# Patient Record
Sex: FEMALE | Race: WHITE | NOT HISPANIC OR LATINO
[De-identification: names, ages, dates, MRNs, and addresses within clinical notes are randomized per-mention and may not be internally consistent; named-entity substitution may affect disease eponyms.]

---

## 2018-07-12 ENCOUNTER — ASOB RESULT (OUTPATIENT)
Age: 33
End: 2018-07-12

## 2018-07-12 ENCOUNTER — APPOINTMENT (OUTPATIENT)
Dept: MATERNAL FETAL MEDICINE | Facility: CLINIC | Age: 33
End: 2018-07-12
Payer: COMMERCIAL

## 2018-07-12 ENCOUNTER — APPOINTMENT (OUTPATIENT)
Dept: ANTEPARTUM | Facility: CLINIC | Age: 33
End: 2018-07-12
Payer: COMMERCIAL

## 2018-07-12 PROCEDURE — 99241 OFFICE CONSULTATION NEW/ESTAB PATIENT 15 MIN: CPT

## 2018-07-12 PROCEDURE — 76801 OB US < 14 WKS SINGLE FETUS: CPT

## 2018-07-16 ENCOUNTER — APPOINTMENT (OUTPATIENT)
Dept: MATERNAL FETAL MEDICINE | Facility: CLINIC | Age: 33
End: 2018-07-16
Payer: COMMERCIAL

## 2018-07-16 ENCOUNTER — ASOB RESULT (OUTPATIENT)
Age: 33
End: 2018-07-16

## 2018-07-16 ENCOUNTER — APPOINTMENT (OUTPATIENT)
Dept: ANTEPARTUM | Facility: CLINIC | Age: 33
End: 2018-07-16
Payer: COMMERCIAL

## 2018-07-16 PROCEDURE — 99241 OFFICE CONSULTATION NEW/ESTAB PATIENT 15 MIN: CPT | Mod: 25

## 2018-07-16 PROCEDURE — 59015 CHORION BIOPSY: CPT

## 2018-07-16 PROCEDURE — 76945 ECHO GUIDE VILLUS SAMPLING: CPT

## 2018-07-20 ENCOUNTER — TRANSCRIPTION ENCOUNTER (OUTPATIENT)
Age: 33
End: 2018-07-20

## 2018-07-30 ENCOUNTER — APPOINTMENT (OUTPATIENT)
Dept: MATERNAL FETAL MEDICINE | Facility: CLINIC | Age: 33
End: 2018-07-30

## 2018-07-30 ENCOUNTER — ASOB RESULT (OUTPATIENT)
Age: 33
End: 2018-07-30

## 2018-07-30 ENCOUNTER — APPOINTMENT (OUTPATIENT)
Dept: ANTEPARTUM | Facility: CLINIC | Age: 33
End: 2018-07-30
Payer: COMMERCIAL

## 2018-07-30 PROCEDURE — 76813 OB US NUCHAL MEAS 1 GEST: CPT

## 2018-09-17 ENCOUNTER — APPOINTMENT (OUTPATIENT)
Dept: ANTEPARTUM | Facility: CLINIC | Age: 33
End: 2018-09-17
Payer: COMMERCIAL

## 2018-09-17 ENCOUNTER — ASOB RESULT (OUTPATIENT)
Age: 33
End: 2018-09-17

## 2018-09-17 PROCEDURE — 76811 OB US DETAILED SNGL FETUS: CPT

## 2018-11-12 ENCOUNTER — ASOB RESULT (OUTPATIENT)
Age: 33
End: 2018-11-12

## 2018-11-12 ENCOUNTER — APPOINTMENT (OUTPATIENT)
Dept: ANTEPARTUM | Facility: CLINIC | Age: 33
End: 2018-11-12
Payer: COMMERCIAL

## 2018-11-12 PROCEDURE — 76816 OB US FOLLOW-UP PER FETUS: CPT

## 2018-12-31 ENCOUNTER — ASOB RESULT (OUTPATIENT)
Age: 33
End: 2018-12-31

## 2018-12-31 ENCOUNTER — APPOINTMENT (OUTPATIENT)
Dept: ANTEPARTUM | Facility: CLINIC | Age: 33
End: 2018-12-31
Payer: COMMERCIAL

## 2018-12-31 PROCEDURE — 76816 OB US FOLLOW-UP PER FETUS: CPT

## 2019-01-27 ENCOUNTER — TRANSCRIPTION ENCOUNTER (OUTPATIENT)
Age: 34
End: 2019-01-27

## 2021-03-16 ENCOUNTER — APPOINTMENT (OUTPATIENT)
Dept: INTERNAL MEDICINE | Facility: CLINIC | Age: 36
End: 2021-03-16
Payer: COMMERCIAL

## 2021-03-16 VITALS
WEIGHT: 133 LBS | SYSTOLIC BLOOD PRESSURE: 108 MMHG | DIASTOLIC BLOOD PRESSURE: 71 MMHG | HEART RATE: 67 BPM | RESPIRATION RATE: 12 BRPM | HEIGHT: 64 IN | BODY MASS INDEX: 22.71 KG/M2 | OXYGEN SATURATION: 98 %

## 2021-03-16 DIAGNOSIS — Z83.3 FAMILY HISTORY OF DIABETES MELLITUS: ICD-10-CM

## 2021-03-16 DIAGNOSIS — Z78.9 OTHER SPECIFIED HEALTH STATUS: ICD-10-CM

## 2021-03-16 DIAGNOSIS — Z83.438 FAMILY HISTORY OF OTHER DISORDER OF LIPOPROTEIN METABOLISM AND OTHER LIPIDEMIA: ICD-10-CM

## 2021-03-16 DIAGNOSIS — Z83.79 FAMILY HISTORY OF OTHER DISEASES OF THE DIGESTIVE SYSTEM: ICD-10-CM

## 2021-03-16 DIAGNOSIS — Z82.49 FAMILY HISTORY OF ISCHEMIC HEART DISEASE AND OTHER DISEASES OF THE CIRCULATORY SYSTEM: ICD-10-CM

## 2021-03-16 DIAGNOSIS — Z83.49 FAMILY HISTORY OF OTHER ENDOCRINE, NUTRITIONAL AND METABOLIC DISEASES: ICD-10-CM

## 2021-03-16 DIAGNOSIS — Z82.61 FAMILY HISTORY OF ARTHRITIS: ICD-10-CM

## 2021-03-16 PROCEDURE — 99072 ADDL SUPL MATRL&STAF TM PHE: CPT

## 2021-03-16 PROCEDURE — 99204 OFFICE O/P NEW MOD 45 MIN: CPT | Mod: 25

## 2021-03-16 PROCEDURE — 36415 COLL VENOUS BLD VENIPUNCTURE: CPT

## 2021-03-16 NOTE — REVIEW OF SYSTEMS
[Fever] : no fever [Chills] : no chills [Fatigue] : no fatigue [Chest Pain] : no chest pain [Shortness Of Breath] : no shortness of breath [Abdominal Pain] : no abdominal pain [Nausea] : no nausea [Constipation] : no constipation [Diarrhea] : diarrhea [Vomiting] : no vomiting [Joint Pain] : no joint pain [Nail Changes] : no nail changes [Hair Changes] : no hair changes [Skin Rash] : no skin rash [Depression] : depression

## 2021-03-16 NOTE — HISTORY OF PRESENT ILLNESS
[FreeTextEntry1] : new patient, weight gain  [de-identified] : Ana María presents as new patient.\par \par She has hx of UC (symptom free, not on medications) and depression. She presents today with concerns about weight gain. She has gained about 15-20 pounds since October. No change in medications and had been on bupropion and fluoxetine in past. She is worried about thyroid dysfunction given her family history. Her mom's thyroid dysfunction was diagnosed in post partum period. her youngest son is 2 years old. She notes no change in diet or exercise level.

## 2021-03-16 NOTE — PHYSICAL EXAM
[No Acute Distress] : no acute distress [Well Nourished] : well nourished [Well Developed] : well developed [Well-Appearing] : well-appearing [Thyroid Normal, No Nodules] : the thyroid was normal and there were no nodules present [No Respiratory Distress] : no respiratory distress  [No Accessory Muscle Use] : no accessory muscle use [Clear to Auscultation] : lungs were clear to auscultation bilaterally [Normal Rate] : normal rate  [Regular Rhythm] : with a regular rhythm [Normal S1, S2] : normal S1 and S2 [No Murmur] : no murmur heard [No Carotid Bruits] : no carotid bruits [No Edema] : there was no peripheral edema [Soft] : abdomen soft [Non Tender] : non-tender [Non-distended] : non-distended [Normal Bowel Sounds] : normal bowel sounds [Normal Gait] : normal gait [Deep Tendon Reflexes (DTR)] : deep tendon reflexes were 2+ and symmetric [Alert and Oriented x3] : oriented to person, place, and time

## 2021-03-16 NOTE — ASSESSMENT
[FreeTextEntry1] : 1. Weight gain, fam hx of hypothyroidism\par TSH, T4, T3\par Thyroid Abs, TSI Ab\par

## 2021-03-19 LAB
T3FREE SERPL-MCNC: 2.68 PG/ML
T4 FREE SERPL-MCNC: 1.1 NG/DL
T4 SERPL-MCNC: 6.9 UG/DL
THYROGLOB AB SERPL-ACNC: <20 IU/ML
THYROPEROXIDASE AB SERPL IA-ACNC: <10 IU/ML
TSH SERPL-ACNC: 0.98 UIU/ML
TSI ACT/NOR SER: <0.1 IU/L

## 2021-04-20 ENCOUNTER — NON-APPOINTMENT (OUTPATIENT)
Age: 36
End: 2021-04-20

## 2021-04-20 ENCOUNTER — APPOINTMENT (OUTPATIENT)
Dept: INTERNAL MEDICINE | Facility: CLINIC | Age: 36
End: 2021-04-20
Payer: COMMERCIAL

## 2021-04-20 VITALS
HEART RATE: 69 BPM | WEIGHT: 137 LBS | BODY MASS INDEX: 23.39 KG/M2 | RESPIRATION RATE: 12 BRPM | HEIGHT: 64 IN | DIASTOLIC BLOOD PRESSURE: 75 MMHG | SYSTOLIC BLOOD PRESSURE: 118 MMHG | OXYGEN SATURATION: 98 %

## 2021-04-20 DIAGNOSIS — F32.9 MAJOR DEPRESSIVE DISORDER, SINGLE EPISODE, UNSPECIFIED: ICD-10-CM

## 2021-04-20 DIAGNOSIS — K51.90 ULCERATIVE COLITIS, UNSPECIFIED, W/OUT COMPLICATIONS: ICD-10-CM

## 2021-04-20 DIAGNOSIS — Z00.00 ENCOUNTER FOR GENERAL ADULT MEDICAL EXAMINATION W/OUT ABNORMAL FINDINGS: ICD-10-CM

## 2021-04-20 DIAGNOSIS — R94.31 ABNORMAL ELECTROCARDIOGRAM [ECG] [EKG]: ICD-10-CM

## 2021-04-20 DIAGNOSIS — R63.5 ABNORMAL WEIGHT GAIN: ICD-10-CM

## 2021-04-20 PROCEDURE — 99072 ADDL SUPL MATRL&STAF TM PHE: CPT

## 2021-04-20 PROCEDURE — 99395 PREV VISIT EST AGE 18-39: CPT | Mod: 25

## 2021-04-20 PROCEDURE — G0442 ANNUAL ALCOHOL SCREEN 15 MIN: CPT

## 2021-04-20 PROCEDURE — 36415 COLL VENOUS BLD VENIPUNCTURE: CPT

## 2021-04-20 PROCEDURE — G0444 DEPRESSION SCREEN ANNUAL: CPT | Mod: 59

## 2021-04-20 PROCEDURE — 93000 ELECTROCARDIOGRAM COMPLETE: CPT | Mod: 59

## 2021-04-20 PROCEDURE — 99215 OFFICE O/P EST HI 40 MIN: CPT | Mod: 25

## 2021-04-20 RX ORDER — BUPROPION HYDROCHLORIDE 150 MG/1
150 TABLET, EXTENDED RELEASE ORAL DAILY
Refills: 0 | Status: ACTIVE | COMMUNITY

## 2021-04-20 RX ORDER — BUPROPION HYDROCHLORIDE 75 MG/1
TABLET, FILM COATED ORAL
Refills: 0 | Status: DISCONTINUED | COMMUNITY
End: 2021-04-20

## 2021-04-20 RX ORDER — FLUOXETINE HYDROCHLORIDE 20 MG/1
20 TABLET ORAL DAILY
Refills: 0 | Status: ACTIVE | COMMUNITY

## 2021-04-20 NOTE — HEALTH RISK ASSESSMENT
[Yes] : Yes [Monthly or less (1 pt)] : Monthly or less (1 point) [1 or 2 (0 pts)] : 1 or 2 (0 points) [Never (0 pts)] : Never (0 points) [1] : 2) Feeling down, depressed, or hopeless for several days (1) [Patient reported mammogram was normal] : Patient reported mammogram was normal [Fully functional (bathing, dressing, toileting, transferring, walking, feeding)] : Fully functional (bathing, dressing, toileting, transferring, walking, feeding) [Fully functional (using the telephone, shopping, preparing meals, housekeeping, doing laundry, using] : Fully functional and needs no help or supervision to perform IADLs (using the telephone, shopping, preparing meals, housekeeping, doing laundry, using transportation, managing medications and managing finances) [] : No [[id number]] : 2 [[id number]] : 5 [MammogramComments] : Overdue, will re-establish with GYN [ColonoscopyComments] : Last saw GI in 2018 for gallbladder disease, off meds for UC for some time. Asymptomatic. Advised she follow up- will see Dr. Hossein francis no

## 2021-04-20 NOTE — ASSESSMENT
[FreeTextEntry1] : 1. CPE\par Check CBC, CMP, A1c, lipids, UA\par  EKG abnormal, see below\par Will re-establsih with GYN for PAP\par [place]- see below\par \par 2. Weight gain\par Continued weight gain despite increased exercise and diet changes\par No abnormal abdominal bloating or distention or other symptoms\par Weight is her typical weight gain location\par TFTs normal but rule out acromegaly with IGF1 and [place] with salivary cortisol x2\par \par 3. UC\par Needs to re-establsih with GI\par Needs her screening colo for CRC sreening\par Will re-establish with her prior GI\par \par 4. Fatigue\par Check iron, B12, D\par \par 5. Abnormal EKG\par Conduction delay with wide QRS\par No cardiac symptoms and preserved exercise capacity\par Will see Cardiology for evaluation

## 2021-04-20 NOTE — PHYSICAL EXAM
[No Acute Distress] : no acute distress [Well Nourished] : well nourished [Well Developed] : well developed [Well-Appearing] : well-appearing [Normal Sclera/Conjunctiva] : normal sclera/conjunctiva [No Respiratory Distress] : no respiratory distress  [No Accessory Muscle Use] : no accessory muscle use [Clear to Auscultation] : lungs were clear to auscultation bilaterally [Normal Rate] : normal rate  [Regular Rhythm] : with a regular rhythm [Normal S1, S2] : normal S1 and S2 [No Murmur] : no murmur heard [No Carotid Bruits] : no carotid bruits [No Edema] : there was no peripheral edema [Normal Appearance] : normal in appearance [No Masses] : no palpable masses [No Axillary Lymphadenopathy] : no axillary lymphadenopathy [Soft] : abdomen soft [Non Tender] : non-tender [Non-distended] : non-distended [Normal Bowel Sounds] : normal bowel sounds [Normal Supraclavicular Nodes] : no supraclavicular lymphadenopathy [Normal Axillary Nodes] : no axillary lymphadenopathy [Normal Posterior Cervical Nodes] : no posterior cervical lymphadenopathy [Normal Anterior Cervical Nodes] : no anterior cervical lymphadenopathy [No Spinal Tenderness] : no spinal tenderness [Normal Gait] : normal gait [Alert and Oriented x3] : oriented to person, place, and time

## 2021-04-20 NOTE — REVIEW OF SYSTEMS
[Fatigue] : fatigue [Recent Change In Weight] : ~T recent weight change [Depression] : depression [Fever] : no fever [Chills] : no chills [Night Sweats] : no night sweats [Nasal Discharge] : no nasal discharge [Sore Throat] : no sore throat [Chest Pain] : no chest pain [Palpitations] : no palpitations [Shortness Of Breath] : no shortness of breath [Wheezing] : no wheezing [Cough] : no cough [Abdominal Pain] : no abdominal pain [Nausea] : no nausea [Constipation] : no constipation [Diarrhea] : diarrhea [Vomiting] : no vomiting [Melena] : no melena [Dysuria] : no dysuria [Hematuria] : no hematuria [Joint Pain] : no joint pain [Muscle Pain] : no muscle pain [Headache] : no headache [Dizziness] : no dizziness [Anxiety] : no anxiety

## 2021-04-20 NOTE — HISTORY OF PRESENT ILLNESS
[FreeTextEntry1] : CPE, weight gain  [de-identified] : Ana María returns for CPE.\par \par She is still concerned about weight gain. Last summer, she weighed 115 pounds. Her only change is no longer working and staying at home. Reports she was more stressed while working. However, she has been actively trying to lose weight. Over last month, she has amplified her efforts. She is tracking her nutrition with [employer] and is eating approximately 1600calories per day. This is an improvement and she is more cognizant of her diet. She has also been exercising and is running 2-3 miles per day. Her weight has gone up 4 pounds in the last month despite this. No changes to her medicines. Weight is mostly in hips and thighs. No abdominal distention or bloating.No change in shoe size but wedding band no longer fits her.\par \par She is also slightly more depressed. She follows with online mental health. She has no active SI/HI.

## 2021-04-24 DIAGNOSIS — R79.89 OTHER SPECIFIED ABNORMAL FINDINGS OF BLOOD CHEMISTRY: ICD-10-CM

## 2021-04-24 LAB
25(OH)D3 SERPL-MCNC: 23.5 NG/ML
ALBUMIN SERPL ELPH-MCNC: 4.3 G/DL
ALP BLD-CCNC: 64 U/L
ALT SERPL-CCNC: 32 U/L
ANION GAP SERPL CALC-SCNC: 11 MMOL/L
APPEARANCE: CLEAR
AST SERPL-CCNC: 26 U/L
BACTERIA: NEGATIVE
BASOPHILS # BLD AUTO: 0.05 K/UL
BASOPHILS NFR BLD AUTO: 0.8 %
BILIRUB SERPL-MCNC: 0.5 MG/DL
BILIRUBIN URINE: NEGATIVE
BLOOD URINE: NORMAL
BUN SERPL-MCNC: 16 MG/DL
CALCIUM SERPL-MCNC: 9.4 MG/DL
CHLORIDE SERPL-SCNC: 103 MMOL/L
CHOLEST SERPL-MCNC: 177 MG/DL
CO2 SERPL-SCNC: 22 MMOL/L
COLOR: YELLOW
CREAT SERPL-MCNC: 0.82 MG/DL
EOSINOPHIL # BLD AUTO: 0.06 K/UL
EOSINOPHIL NFR BLD AUTO: 0.9 %
ESTIMATED AVERAGE GLUCOSE: 105 MG/DL
FERRITIN SERPL-MCNC: 21 NG/ML
GLUCOSE QUALITATIVE U: NEGATIVE
GLUCOSE SERPL-MCNC: 101 MG/DL
HBA1C MFR BLD HPLC: 5.3 %
HCT VFR BLD CALC: 39.3 %
HDLC SERPL-MCNC: 74 MG/DL
HGB BLD-MCNC: 12.2 G/DL
HYALINE CASTS: 1 /LPF
IGF-1 INTERP: NORMAL
IGF-I BLD-MCNC: 189 NG/ML
IMM GRANULOCYTES NFR BLD AUTO: 0.2 %
IRON SATN MFR SERPL: 28 %
IRON SERPL-MCNC: 92 UG/DL
KETONES URINE: NEGATIVE
LDLC SERPL CALC-MCNC: 92 MG/DL
LEUKOCYTE ESTERASE URINE: ABNORMAL
LYMPHOCYTES # BLD AUTO: 2 K/UL
LYMPHOCYTES NFR BLD AUTO: 30.9 %
MAN DIFF?: NORMAL
MCHC RBC-ENTMCNC: 29.8 PG
MCHC RBC-ENTMCNC: 31 GM/DL
MCV RBC AUTO: 95.9 FL
MICROSCOPIC-UA: NORMAL
MONOCYTES # BLD AUTO: 0.29 K/UL
MONOCYTES NFR BLD AUTO: 4.5 %
NEUTROPHILS # BLD AUTO: 4.06 K/UL
NEUTROPHILS NFR BLD AUTO: 62.7 %
NITRITE URINE: NEGATIVE
NONHDLC SERPL-MCNC: 104 MG/DL
PH URINE: 6
PLATELET # BLD AUTO: 333 K/UL
POTASSIUM SERPL-SCNC: 4.4 MMOL/L
PROT SERPL-MCNC: 6.8 G/DL
PROTEIN URINE: NEGATIVE
RBC # BLD: 4.1 M/UL
RBC # FLD: 12.2 %
RED BLOOD CELLS URINE: 2 /HPF
SODIUM SERPL-SCNC: 137 MMOL/L
SPECIFIC GRAVITY URINE: 1.02
SQUAMOUS EPITHELIAL CELLS: 5 /HPF
TIBC SERPL-MCNC: 335 UG/DL
TRIGL SERPL-MCNC: 61 MG/DL
TSH SERPL-ACNC: 1.17 UIU/ML
UIBC SERPL-MCNC: 242 UG/DL
UROBILINOGEN URINE: NORMAL
WBC # FLD AUTO: 6.47 K/UL
WHITE BLOOD CELLS URINE: 6 /HPF

## 2021-07-20 ENCOUNTER — RX RENEWAL (OUTPATIENT)
Age: 36
End: 2021-07-20

## 2021-07-20 RX ORDER — CHOLECALCIFEROL (VITAMIN D3) 25 MCG
25 MCG CAPSULE ORAL
Qty: 90 | Refills: 0 | Status: ACTIVE | COMMUNITY
Start: 2021-07-20 | End: 1900-01-01

## 2021-11-10 ENCOUNTER — ASOB RESULT (OUTPATIENT)
Age: 36
End: 2021-11-10

## 2021-11-10 ENCOUNTER — APPOINTMENT (OUTPATIENT)
Dept: MATERNAL FETAL MEDICINE | Facility: CLINIC | Age: 36
End: 2021-11-10
Payer: COMMERCIAL

## 2021-11-10 PROCEDURE — 99202 OFFICE O/P NEW SF 15 MIN: CPT | Mod: 95

## 2021-11-18 ENCOUNTER — NON-APPOINTMENT (OUTPATIENT)
Age: 36
End: 2021-11-18

## 2021-12-03 ENCOUNTER — APPOINTMENT (OUTPATIENT)
Dept: MATERNAL FETAL MEDICINE | Facility: CLINIC | Age: 36
End: 2021-12-03

## 2021-12-03 ENCOUNTER — APPOINTMENT (OUTPATIENT)
Dept: ANTEPARTUM | Facility: CLINIC | Age: 36
End: 2021-12-03

## 2022-03-27 ENCOUNTER — TRANSCRIPTION ENCOUNTER (OUTPATIENT)
Age: 37
End: 2022-03-27

## 2022-08-15 ENCOUNTER — APPOINTMENT (OUTPATIENT)
Dept: MATERNAL FETAL MEDICINE | Facility: CLINIC | Age: 37
End: 2022-08-15

## 2022-08-19 ENCOUNTER — RESULT REVIEW (OUTPATIENT)
Age: 37
End: 2022-08-19

## 2022-08-23 ENCOUNTER — ASOB RESULT (OUTPATIENT)
Age: 37
End: 2022-08-23

## 2022-08-23 ENCOUNTER — APPOINTMENT (OUTPATIENT)
Dept: ANTEPARTUM | Facility: CLINIC | Age: 37
End: 2022-08-23

## 2022-08-23 ENCOUNTER — APPOINTMENT (OUTPATIENT)
Dept: MATERNAL FETAL MEDICINE | Facility: CLINIC | Age: 37
End: 2022-08-23

## 2022-08-23 PROCEDURE — 99212 OFFICE O/P EST SF 10 MIN: CPT | Mod: 95

## 2022-08-23 PROCEDURE — 99202 OFFICE O/P NEW SF 15 MIN: CPT | Mod: 95

## 2022-09-19 ENCOUNTER — ASOB RESULT (OUTPATIENT)
Age: 37
End: 2022-09-19

## 2022-09-19 ENCOUNTER — APPOINTMENT (OUTPATIENT)
Dept: ANTEPARTUM | Facility: CLINIC | Age: 37
End: 2022-09-19
Payer: COMMERCIAL

## 2022-09-19 ENCOUNTER — APPOINTMENT (OUTPATIENT)
Dept: MATERNAL FETAL MEDICINE | Facility: CLINIC | Age: 37
End: 2022-09-19

## 2022-09-19 PROCEDURE — 59015 CHORION BIOPSY: CPT

## 2022-09-19 PROCEDURE — ZZZZZ: CPT

## 2022-09-19 PROCEDURE — 76945 ECHO GUIDE VILLUS SAMPLING: CPT

## 2022-09-22 ENCOUNTER — TRANSCRIPTION ENCOUNTER (OUTPATIENT)
Age: 37
End: 2022-09-22

## 2022-09-27 ENCOUNTER — NON-APPOINTMENT (OUTPATIENT)
Age: 37
End: 2022-09-27

## 2022-10-21 ENCOUNTER — APPOINTMENT (OUTPATIENT)
Dept: INTERNAL MEDICINE | Facility: CLINIC | Age: 37
End: 2022-10-21

## 2022-11-17 ENCOUNTER — ASOB RESULT (OUTPATIENT)
Age: 37
End: 2022-11-17

## 2022-11-17 ENCOUNTER — APPOINTMENT (OUTPATIENT)
Dept: ANTEPARTUM | Facility: CLINIC | Age: 37
End: 2022-11-17

## 2022-11-17 PROCEDURE — 76811 OB US DETAILED SNGL FETUS: CPT

## 2023-01-23 ENCOUNTER — OUTPATIENT (OUTPATIENT)
Dept: OUTPATIENT SERVICES | Facility: HOSPITAL | Age: 38
LOS: 1 days | End: 2023-01-23
Payer: COMMERCIAL

## 2023-01-23 VITALS — HEART RATE: 68 BPM | OXYGEN SATURATION: 98 %

## 2023-01-23 DIAGNOSIS — O26.899 OTHER SPECIFIED PREGNANCY RELATED CONDITIONS, UNSPECIFIED TRIMESTER: ICD-10-CM

## 2023-01-23 DIAGNOSIS — Z98.82 BREAST IMPLANT STATUS: Chronic | ICD-10-CM

## 2023-01-23 DIAGNOSIS — Z90.49 ACQUIRED ABSENCE OF OTHER SPECIFIED PARTS OF DIGESTIVE TRACT: Chronic | ICD-10-CM

## 2023-01-23 LAB
APPEARANCE UR: ABNORMAL
BACTERIA # UR AUTO: NEGATIVE — SIGNIFICANT CHANGE UP
BILIRUB UR-MCNC: NEGATIVE — SIGNIFICANT CHANGE UP
COLOR SPEC: SIGNIFICANT CHANGE UP
DIFF PNL FLD: NEGATIVE — SIGNIFICANT CHANGE UP
EPI CELLS # UR: 6 /HPF — HIGH
GLUCOSE UR QL: NEGATIVE — SIGNIFICANT CHANGE UP
HYALINE CASTS # UR AUTO: 1 /LPF — SIGNIFICANT CHANGE UP (ref 0–2)
KETONES UR-MCNC: NEGATIVE — SIGNIFICANT CHANGE UP
LEUKOCYTE ESTERASE UR-ACNC: ABNORMAL
NITRITE UR-MCNC: NEGATIVE — SIGNIFICANT CHANGE UP
PH UR: 6.5 — SIGNIFICANT CHANGE UP (ref 5–8)
PROT UR-MCNC: NEGATIVE — SIGNIFICANT CHANGE UP
RBC CASTS # UR COMP ASSIST: 1 /HPF — SIGNIFICANT CHANGE UP (ref 0–4)
SP GR SPEC: 1.01 — SIGNIFICANT CHANGE UP (ref 1.01–1.02)
UROBILINOGEN FLD QL: NEGATIVE — SIGNIFICANT CHANGE UP
WBC UR QL: 2 /HPF — SIGNIFICANT CHANGE UP (ref 0–5)

## 2023-01-23 PROCEDURE — 59025 FETAL NON-STRESS TEST: CPT | Mod: 26

## 2023-01-23 RX ORDER — SODIUM CHLORIDE 9 MG/ML
3 INJECTION INTRAMUSCULAR; INTRAVENOUS; SUBCUTANEOUS EVERY 8 HOURS
Refills: 0 | Status: DISCONTINUED | OUTPATIENT
Start: 2023-01-23 | End: 2023-02-07

## 2023-01-23 RX ORDER — SODIUM CHLORIDE 9 MG/ML
1000 INJECTION, SOLUTION INTRAVENOUS ONCE
Refills: 0 | Status: COMPLETED | OUTPATIENT
Start: 2023-01-23 | End: 2023-01-23

## 2023-01-23 RX ORDER — SODIUM CHLORIDE 9 MG/ML
1000 INJECTION, SOLUTION INTRAVENOUS
Refills: 0 | Status: DISCONTINUED | OUTPATIENT
Start: 2023-01-23 | End: 2023-02-07

## 2023-01-23 RX ADMIN — SODIUM CHLORIDE 1000 MILLILITER(S): 9 INJECTION, SOLUTION INTRAVENOUS at 22:20

## 2023-01-23 RX ADMIN — SODIUM CHLORIDE 125 MILLILITER(S): 9 INJECTION, SOLUTION INTRAVENOUS at 23:22

## 2023-01-23 NOTE — OB PROVIDER TRIAGE NOTE - NS_OBGYNHISTORY_OBGYN_ALL_OB_FT
x2  Miss x2 with d&c OBHx:   -  2016 FT 7lb2oz   -  2019 FT 7lb4oz   - [facility] s/p D&C   GynHx: denies any ovarian cysts, fibroids, abnl pap smears

## 2023-01-23 NOTE — OB PROVIDER TRIAGE NOTE - HISTORY OF PRESENT ILLNESS
R3 OB Triage Note     37 y.o.  @29w4d presenting for abdominal cramping x 2 hours.  R3 OB Triage Note     37 y.o.  @29w4d presenting for abdominal cramping x 2 hours. Patient reports she was sitting in an office waiting room when the pain began in her lower back and mid-lower abdomen. She reports it was an intense cramping pain that felt constant 3-/10. Following this the pain got better and she now feels more so tightening every few minutes 1/10. She denies any vaginal bleeding, LOF. +FM     OBHx:   -  2016 FT 7lb2oz   -  2019 FT 7lb4oz   - [facility] s/p D&C   GynHx: denies any ovarian cysts, fibroids, abnl pap smears   PMHx: denies   Meds: PNV, Iron   SHx: cholecystectomy, breast augmentation   Psych: + anxiety/depression   Social: denies any alcohol, tobacco, illicit substance use   All: NKDA

## 2023-01-23 NOTE — OB PROVIDER TRIAGE NOTE - NSOBPROVIDERNOTE_OBGYN_ALL_OB_FT
37 y.o.  @29w4d presenting for abdominal cramping x 2 hours. CL approximately 3.5, fetal status reassuring. No concerning signs for  labor at this point.     - UA   - Hydration (po or IV)   - Continue to monitor     d/w Dr. Sindy Dorado PGY-3 37 y.o.  @29w4d presenting for abdominal cramping x 2 hours. CL approximately 3.5, fetal status reassuring. No concerning signs for  labor at this point.     - UA   - Hydration (po or IV)   - Continue to monitor     d/w Dr. Sindy Dorado PGY-3    ------------------------------------------------------------------------------------------------------------    2300     Patient seen at bedside for reevaluation and noted to be sleeping. Will return when UA results. 37 y.o.  @29w4d presenting for abdominal cramping x 2 hours. CL approximately 3.5, fetal status reassuring. No concerning signs for  labor at this point.     - UA   - Hydration (po or IV)   - Continue to monitor     d/w Dr. Sindy Dorado PGY-3    ------------------------------------------------------------------------------------------------------------    0     Patient seen at bedside for reevaluation and noted to be sleeping, will return when UA results     -----------------------------------------------------------------------------------------------------     0000      Patient seen at bedside, she reports she feels contractions but that they continue to just feel like tightening, no worse than prior. UA now resulted with small leuks, otherwise wnl. Patient now s/p 1L bolus, on maintenance fluid of LR@125.     - continue to monitor     d/w Dr. Sindy Dorado PGY-3 37 y.o.  @29w4d presenting for abdominal cramping x 2 hours. CL approximately 3.5, fetal status reassuring. No concerning signs for  labor at this point.     - UA   - Hydration (po or IV)   - Continue to monitor     d/w Dr. Sindy Dorado PGY-3    ------------------------------------------------------------------------------------------------------------    0     Patient seen at bedside for reevaluation and noted to be sleeping, will return when UA results     -----------------------------------------------------------------------------------------------------     0000      Patient seen at bedside, she reports she feels contractions but that they continue to just feel like tightening, no worse than prior. UA now resulted with small leuks, otherwise wnl. Patient now s/p 1L bolus, on maintenance fluid of LR@125.     - continue to monitor     d/w Dr. Sindy Dorado PGY-3    ------------------------------------------------------------------------------------------------------------------------------------------------------------    0150     Patient evaluated at bedside for reevaluation. She reports contractions have spaced out now and that she feels more comfortable.   SVE: 0/0/-3     d/w Dr. Sindy Dorado PGY-3 37 y.o.  @29w4d presenting for abdominal cramping x 2 hours. CL approximately 3.5, fetal status reassuring. No concerning signs for  labor at this point.     - UA   - Hydration (po or IV)   - Continue to monitor     d/w Dr. Sindy Dorado PGY-3    ------------------------------------------------------------------------------------------------------------    0     Patient seen at bedside for reevaluation and noted to be sleeping, will return when UA results     -----------------------------------------------------------------------------------------------------     0000      Patient seen at bedside, she reports she feels contractions but that they continue to just feel like tightening, no worse than prior. UA now resulted with small leuks, otherwise wnl. Patient now s/p 1L bolus, on maintenance fluid of LR@125.     - continue to monitor     d/w Dr. Sindy Dorado PGY-3    ------------------------------------------------------------------------------------------------------------------------------------------------------------    0430     Patient evaluated at bedside for reevaluation. She reports contractions have spaced out now and that she feels more comfortable.   SVE: 0/0/-3     d/w Dr. Sindy Dorado PGY-3    0500     Patient seen at bedside to review symptoms, she again reports improved. Discussed with her following up on anesthesia consultation and  labor precautions.     - dc with  labor precautions   - Patient to follow up in office     d/w Dr. Sindy Dorado PGY-3 37 y.o.  @29w4d presenting for abdominal cramping x 2 hours. CL approximately 3.5, fetal status reassuring. No concerning signs for  labor at this point.     - UA   - Hydration (po or IV)   - Continue to monitor     d/w Dr. Sindy Dorado PGY-3    ------------------------------------------------------------------------------------------------------------    0     Patient seen at bedside for reevaluation and noted to be sleeping, will return when UA results     -----------------------------------------------------------------------------------------------------     0000      Patient seen at bedside, she reports she feels contractions but that they continue to just feel like tightening, no worse than prior. UA now resulted with small leuks, otherwise wnl. Patient now s/p 1L bolus, on maintenance fluid of LR@125.     - continue to monitor     d/w Dr. Sindy Dorado PGY-3    ------------------------------------------------------------------------------------------------------------------------------------------------------------    0430     Patient evaluated at bedside for reevaluation. She reports contractions have spaced out now and that she feels more comfortable.   SVE: 0/0/-3     d/w Dr. Sindy Dorado PGY-3    0500     Patient seen at bedside to review symptoms, she again reports improved. Discussed with her following up on anesthesia consultation and  labor precautions.     - dc with  labor precautions   - Patient to follow up in office     d/w Dr. Sindy Dorado PGY-3      Attending Addendum-  Case reviewed with PGY3. Ctx spaced out and pt comfortable and sleeping. Reviewed rec send UCx. PTL prec reviewed. To fu in office call sooner with any concerns.     Kalani Callahan, DO

## 2023-01-23 NOTE — OB PROVIDER TRIAGE NOTE - NSHPPHYSICALEXAM_GEN_ALL_CORE
Vital Signs Last 24 Hrs  T(C): 37.1 (23 Jan 2023 20:35), Max: 37.1 (23 Jan 2023 20:35)  T(F): 98.8 (23 Jan 2023 20:35), Max: 98.8 (23 Jan 2023 20:35)  HR: 75 (23 Jan 2023 22:16) (64 - 86)  BP: 125/62 (23 Jan 2023 20:37) (125/62 - 125/62)  BP(mean): --  RR: 19 (23 Jan 2023 20:35) (19 - 19)  SpO2: 97% (23 Jan 2023 22:16) (92% - 99%)    Parameters below as of 23 Jan 2023 20:35  Patient On (Oxygen Delivery Method): room air    General: well appearing, NAD   Pulmonary: breathing comfortably on room air   Cardiovascular: extremities well perfused   Spec: + physiologic discharge, cervix appears closed + long   TVUS: 3.43-3.7   TAUS: vtx presentation, anterior placenta, CHRIST 16.34, BPP 8/8   FHR: baseline 125, moderate variability, accelerations present, no decels   [place]: Initially regular ctx q3-4 min, then transitioned to more uterine irritabiltiy Vital Signs Last 24 Hrs  T(C): 37.1 (23 Jan 2023 20:35), Max: 37.1 (23 Jan 2023 20:35)  T(F): 98.8 (23 Jan 2023 20:35), Max: 98.8 (23 Jan 2023 20:35)  HR: 75 (23 Jan 2023 22:16) (64 - 86)  BP: 125/62 (23 Jan 2023 20:37) (125/62 - 125/62)  BP(mean): --  RR: 19 (23 Jan 2023 20:35) (19 - 19)  SpO2: 97% (23 Jan 2023 22:16) (92% - 99%)    Parameters below as of 23 Jan 2023 20:35  Patient On (Oxygen Delivery Method): room air    General: well appearing, NAD   Pulmonary: breathing comfortably on room air   Cardiovascular: extremities well perfused   Spec: + physiologic discharge, cervix appears closed + long   TVUS: 3.43-3.7 cervical length  TAUS: vtx presentation, anterior placenta, CHRIST 16.34, BPP 8/8   FHR: baseline 125, moderate variability, accelerations present, no decels   [place]: Initially regular ctx q3-4 min, then transitioned to more uterine irritabiltiy Vital Signs Last 24 Hrs  T(C): 37.1 (23 Jan 2023 20:35), Max: 37.1 (23 Jan 2023 20:35)  T(F): 98.8 (23 Jan 2023 20:35), Max: 98.8 (23 Jan 2023 20:35)  HR: 75 (23 Jan 2023 22:16) (64 - 86)  BP: 125/62 (23 Jan 2023 20:37) (125/62 - 125/62)  BP(mean): --  RR: 19 (23 Jan 2023 20:35) (19 - 19)  SpO2: 97% (23 Jan 2023 22:16) (92% - 99%)    Parameters below as of 23 Jan 2023 20:35  Patient On (Oxygen Delivery Method): room air    General: well appearing, NAD   Pulmonary: breathing comfortably on room air   Cardiovascular: extremities well perfused   Spec: + physiologic discharge, cervix appears closed + long   TVUS: cervical length 3.43-3.7 cm  TAUS: vtx presentation, anterior placenta, CHRIST 16.34, BPP 8/8   FHR: baseline 125, moderate variability, accelerations present, no decels   [place]: Initially regular ctx q3-4 min, then transitioned to more uterine irritabiltiy

## 2023-01-24 VITALS — HEART RATE: 71 BPM | OXYGEN SATURATION: 97 %

## 2023-01-24 DIAGNOSIS — O26.893 OTHER SPECIFIED PREGNANCY RELATED CONDITIONS, THIRD TRIMESTER: ICD-10-CM

## 2023-01-24 DIAGNOSIS — Z98.82 BREAST IMPLANT STATUS: ICD-10-CM

## 2023-01-24 DIAGNOSIS — O99.891 OTHER SPECIFIED DISEASES AND CONDITIONS COMPLICATING PREGNANCY: ICD-10-CM

## 2023-01-24 DIAGNOSIS — F41.8 OTHER SPECIFIED ANXIETY DISORDERS: ICD-10-CM

## 2023-01-24 DIAGNOSIS — O99.343 OTHER MENTAL DISORDERS COMPLICATING PREGNANCY, THIRD TRIMESTER: ICD-10-CM

## 2023-01-24 DIAGNOSIS — R10.30 LOWER ABDOMINAL PAIN, UNSPECIFIED: ICD-10-CM

## 2023-01-24 DIAGNOSIS — O09.523 SUPERVISION OF ELDERLY MULTIGRAVIDA, THIRD TRIMESTER: ICD-10-CM

## 2023-01-24 DIAGNOSIS — M54.50 LOW BACK PAIN, UNSPECIFIED: ICD-10-CM

## 2023-01-24 DIAGNOSIS — Z3A.29 29 WEEKS GESTATION OF PREGNANCY: ICD-10-CM

## 2023-01-24 DIAGNOSIS — O09.293 SUPERVISION OF PREGNANCY WITH OTHER POOR REPRODUCTIVE OR OBSTETRIC HISTORY, THIRD TRIMESTER: ICD-10-CM

## 2023-01-24 PROCEDURE — 81001 URINALYSIS AUTO W/SCOPE: CPT

## 2023-01-24 PROCEDURE — 87086 URINE CULTURE/COLONY COUNT: CPT

## 2023-01-24 PROCEDURE — 96360 HYDRATION IV INFUSION INIT: CPT

## 2023-01-24 PROCEDURE — G0463: CPT

## 2023-01-25 PROBLEM — F41.9 ANXIETY DISORDER, UNSPECIFIED: Chronic | Status: ACTIVE | Noted: 2023-01-23

## 2023-01-25 LAB
CULTURE RESULTS: SIGNIFICANT CHANGE UP
SPECIMEN SOURCE: SIGNIFICANT CHANGE UP

## 2023-02-02 ENCOUNTER — OUTPATIENT (OUTPATIENT)
Dept: OUTPATIENT SERVICES | Facility: HOSPITAL | Age: 38
LOS: 1 days | End: 2023-02-02
Payer: COMMERCIAL

## 2023-02-02 VITALS — OXYGEN SATURATION: 93 % | SYSTOLIC BLOOD PRESSURE: 108 MMHG | DIASTOLIC BLOOD PRESSURE: 59 MMHG | HEART RATE: 76 BPM

## 2023-02-02 DIAGNOSIS — Z90.49 ACQUIRED ABSENCE OF OTHER SPECIFIED PARTS OF DIGESTIVE TRACT: Chronic | ICD-10-CM

## 2023-02-02 DIAGNOSIS — O26.899 OTHER SPECIFIED PREGNANCY RELATED CONDITIONS, UNSPECIFIED TRIMESTER: ICD-10-CM

## 2023-02-02 DIAGNOSIS — Z98.82 BREAST IMPLANT STATUS: Chronic | ICD-10-CM

## 2023-02-02 LAB
APTT BLD: 23.7 SEC — LOW (ref 27.5–35.5)
BASOPHILS # BLD AUTO: 0.03 K/UL — SIGNIFICANT CHANGE UP (ref 0–0.2)
BASOPHILS NFR BLD AUTO: 0.3 % — SIGNIFICANT CHANGE UP (ref 0–2)
BLD GP AB SCN SERPL QL: NEGATIVE — SIGNIFICANT CHANGE UP
EOSINOPHIL # BLD AUTO: 0.05 K/UL — SIGNIFICANT CHANGE UP (ref 0–0.5)
EOSINOPHIL NFR BLD AUTO: 0.5 % — SIGNIFICANT CHANGE UP (ref 0–6)
HCT VFR BLD CALC: 33.3 % — LOW (ref 34.5–45)
HGB BLD-MCNC: 11.3 G/DL — LOW (ref 11.5–15.5)
IMM GRANULOCYTES NFR BLD AUTO: 0.3 % — SIGNIFICANT CHANGE UP (ref 0–0.9)
INR BLD: 0.96 RATIO — SIGNIFICANT CHANGE UP (ref 0.88–1.16)
LYMPHOCYTES # BLD AUTO: 1.36 K/UL — SIGNIFICANT CHANGE UP (ref 1–3.3)
LYMPHOCYTES # BLD AUTO: 14.4 % — SIGNIFICANT CHANGE UP (ref 13–44)
MCHC RBC-ENTMCNC: 31.4 PG — SIGNIFICANT CHANGE UP (ref 27–34)
MCHC RBC-ENTMCNC: 33.9 GM/DL — SIGNIFICANT CHANGE UP (ref 32–36)
MCV RBC AUTO: 92.5 FL — SIGNIFICANT CHANGE UP (ref 80–100)
MONOCYTES # BLD AUTO: 0.52 K/UL — SIGNIFICANT CHANGE UP (ref 0–0.9)
MONOCYTES NFR BLD AUTO: 5.5 % — SIGNIFICANT CHANGE UP (ref 2–14)
NEUTROPHILS # BLD AUTO: 7.45 K/UL — HIGH (ref 1.8–7.4)
NEUTROPHILS NFR BLD AUTO: 79 % — HIGH (ref 43–77)
NRBC # BLD: 0 /100 WBCS — SIGNIFICANT CHANGE UP (ref 0–0)
PLATELET # BLD AUTO: 222 K/UL — SIGNIFICANT CHANGE UP (ref 150–400)
PROTHROM AB SERPL-ACNC: 11 SEC — SIGNIFICANT CHANGE UP (ref 10.5–13.4)
RBC # BLD: 3.6 M/UL — LOW (ref 3.8–5.2)
RBC # FLD: 12.7 % — SIGNIFICANT CHANGE UP (ref 10.3–14.5)
RH IG SCN BLD-IMP: POSITIVE — SIGNIFICANT CHANGE UP
RH IG SCN BLD-IMP: POSITIVE — SIGNIFICANT CHANGE UP
WBC # BLD: 9.44 K/UL — SIGNIFICANT CHANGE UP (ref 3.8–10.5)
WBC # FLD AUTO: 9.44 K/UL — SIGNIFICANT CHANGE UP (ref 3.8–10.5)

## 2023-02-02 PROCEDURE — 85384 FIBRINOGEN ACTIVITY: CPT

## 2023-02-02 PROCEDURE — 86901 BLOOD TYPING SEROLOGIC RH(D): CPT

## 2023-02-02 PROCEDURE — 85025 COMPLETE CBC W/AUTO DIFF WBC: CPT

## 2023-02-02 PROCEDURE — 76818 FETAL BIOPHYS PROFILE W/NST: CPT

## 2023-02-02 PROCEDURE — 86900 BLOOD TYPING SEROLOGIC ABO: CPT

## 2023-02-02 PROCEDURE — 59025 FETAL NON-STRESS TEST: CPT

## 2023-02-02 PROCEDURE — 85610 PROTHROMBIN TIME: CPT

## 2023-02-02 PROCEDURE — 85730 THROMBOPLASTIN TIME PARTIAL: CPT

## 2023-02-02 PROCEDURE — G0463: CPT

## 2023-02-02 PROCEDURE — 86850 RBC ANTIBODY SCREEN: CPT

## 2023-02-02 RX ORDER — ACETAMINOPHEN 500 MG
975 TABLET ORAL ONCE
Refills: 0 | Status: COMPLETED | OUTPATIENT
Start: 2023-02-02 | End: 2023-02-02

## 2023-02-02 RX ADMIN — Medication 975 MILLIGRAM(S): at 13:38

## 2023-02-02 RX ADMIN — Medication 975 MILLIGRAM(S): at 19:19

## 2023-02-02 NOTE — OB PROVIDER LABOR PROGRESS NOTE - ASSESSMENT
36yo P2 @ 31w s/p fall at 8a with direct abdominal trauma undergoing prolonged monitoring. Has been irregularly florencia, pt comfortable. SVE closed/long, fetal status reassuring with reactive NST. PLan per Dr. Rahman to re-evaluate pt @ 11p to assess if able to d/c home.     - cont present management  - cEFM/toco  - re-eval @11p

## 2023-02-02 NOTE — OB RN TRIAGE NOTE - FALL HARM RISK - UNIVERSAL INTERVENTIONS
Bed in lowest position, wheels locked, appropriate side rails in place/Call bell, personal items and telephone in reach/Instruct patient to call for assistance before getting out of bed or chair/Non-slip footwear when patient is out of bed/[place] to call system/Physically safe environment - no spills, clutter or unnecessary equipment/Purposeful Proactive Rounding/Room/bathroom lighting operational, light cord in reach

## 2023-02-02 NOTE — OB RN TRIAGE NOTE - FALL HARM RISK - TYPE OF ASSESSMENT
Spoke with patient family no concerns with Covid screening questions and they are aware of the mask and visitor policy change.    Chace Millan, EMT  February 11, 2022     Daily Assessment

## 2023-02-02 NOTE — OB PROVIDER TRIAGE NOTE - HISTORY OF PRESENT ILLNESS
R3 Triage Note     36yo  at 31w GA presenting following fall on abdomen. Patient states that at 8am she was helping her toddler bathe and she slipped on water and hit her belly on the side of the tub. She denies any contractions, VB, LOF. Feeling good fetal movement. No other complaints. Of note, patient was recently seen in triage for  contractions and discharged.     OBHx:   -  2016 FT 7lb2oz   -  2019 FT 7lb4oz   - [facility] s/p D&C   GynHx: denies any ovarian cysts, fibroids, abnl pap smears   PMHx: Chiari malformation   Meds: PNV, Iron   SHx: cholecystectomy, breast augmentation   Psych: + anxiety/depression   Social: denies any alcohol, tobacco, illicit substance use   All: NKDA

## 2023-02-02 NOTE — OB PROVIDER LABOR PROGRESS NOTE - NS_SUBJECTIVE/OBJECTIVE_OBGYN_ALL_OB_FT
Patient feeling well, notes some occasional abdominal tightening exacerbated by bands, not painful. +FM.    Vital Signs Last 24 Hrs  T(C): 36.9 (02 Feb 2023 12:18), Max: 36.9 (02 Feb 2023 12:18)  T(F): 98.4 (02 Feb 2023 12:18), Max: 98.4 (02 Feb 2023 12:18)  HR: 75 (02 Feb 2023 19:15) (58 - 96)  BP: 116/63 (02 Feb 2023 13:38) (108/59 - 116/63)  RR: 18 (02 Feb 2023 12:18) (18 - 18)  SpO2: 97% (02 Feb 2023 19:15) (92% - 100%)

## 2023-02-02 NOTE — OB PROVIDER TRIAGE NOTE - NSHPPHYSICALEXAM_GEN_ALL_CORE
VS  T(C): 36.9 (02-02-23 @ 12:18)  HR: 70 (02-02-23 @ 16:41)  BP: 116/63 (02-02-23 @ 13:38)  RR: 18 (02-02-23 @ 12:18)  SpO2: 96% (02-02-23 @ 16:41)    Gen: well appearing, NAD  Abd: soft, nontender. No bruising   Ext: No edema, erythema     NST: 120, mod omar, +accels, -decels  [place]: ctx q5-6 min     TAUS: Vtx, BPP 8/8.

## 2023-02-02 NOTE — CHART NOTE - NSCHARTNOTEFT_GEN_A_CORE
patient was sent in from the office after she called after slipping in the bathroom getting her child out of the tub and hit her abdomen.  she has no pain.  feels fine.  good fm  she is having contractions on the monitor and patient was told that we should keep her for observation.  She is fine with that but has an appt. with her neurosurgeon tomorrow morning as required by anesthesia after her consult and she does not want to miss the appointment.  I told her that we would observe for a few hours to be sure she is not going into labor or abrupting.  she is not aware of any of the contractions and feels fine.   nst reactive for dates.

## 2023-02-02 NOTE — OB PROVIDER TRIAGE NOTE - NSOBPROVIDERNOTE_OBGYN_ALL_OB_FT
36yo  at 31w GA presenting following fall on abdomen at 8AM. Patient is asymptomatic. VSS. PE benign. Labs wnl. NST/BPP reassuring. Patient florencia on toco. Unclear if new onset contractions or consistent with prior visit. Low suspicion for abruption.     - prolonged monitoring   - Rh+ rhogam not indicated   - likely d/c home if maternal/fetal status remains reassuring    Elsy Wong MD PGY3  d/w Dr. Rahman

## 2023-02-03 ENCOUNTER — NON-APPOINTMENT (OUTPATIENT)
Age: 38
End: 2023-02-03

## 2023-02-03 ENCOUNTER — APPOINTMENT (OUTPATIENT)
Dept: NEUROSURGERY | Facility: CLINIC | Age: 38
End: 2023-02-03
Payer: COMMERCIAL

## 2023-02-03 VITALS
HEART RATE: 80 BPM | TEMPERATURE: 98.3 F | OXYGEN SATURATION: 98 % | DIASTOLIC BLOOD PRESSURE: 74 MMHG | BODY MASS INDEX: 26.63 KG/M2 | WEIGHT: 156 LBS | SYSTOLIC BLOOD PRESSURE: 108 MMHG | HEIGHT: 64 IN

## 2023-02-03 VITALS — HEART RATE: 78 BPM | OXYGEN SATURATION: 100 %

## 2023-02-03 DIAGNOSIS — Z3A.31 31 WEEKS GESTATION OF PREGNANCY: ICD-10-CM

## 2023-02-03 DIAGNOSIS — F41.9 ANXIETY DISORDER, UNSPECIFIED: ICD-10-CM

## 2023-02-03 DIAGNOSIS — O99.343 OTHER MENTAL DISORDERS COMPLICATING PREGNANCY, THIRD TRIMESTER: ICD-10-CM
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
## 2023-02-03 DIAGNOSIS — O09.523 SUPERVISION OF ELDERLY MULTIGRAVIDA, THIRD TRIMESTER: ICD-10-CM

## 2023-02-03 DIAGNOSIS — F32.9 MAJOR DEPRESSIVE DISORDER, SINGLE EPISODE, UNSPECIFIED: ICD-10-CM

## 2023-02-03 DIAGNOSIS — O09.293 SUPERVISION OF PREGNANCY WITH OTHER POOR REPRODUCTIVE OR OBSTETRIC HISTORY, THIRD TRIMESTER: ICD-10-CM

## 2023-02-03 PROCEDURE — 99072 ADDL SUPL MATRL&STAF TM PHE: CPT

## 2023-02-03 PROCEDURE — 99203 OFFICE O/P NEW LOW 30 MIN: CPT
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
## 2023-03-06 NOTE — PHYSICAL EXAM
[General Appearance - Alert] : alert [General Appearance - In No Acute Distress] : in no acute distress [Oriented To Time, Place, And Person] : oriented to person, place, and time [Impaired Insight] : insight and judgment were intact [Affect] : the affect was normal [Person] : oriented to person [Place] : oriented to place [Time] : oriented to time [Short Term Intact] : short term memory intact [Remote Intact] : remote memory intact [Span Intact] : the attention span was normal [Concentration Intact] : normal concentrating ability [Fluency] : fluency intact [Comprehension] : comprehension intact [Current Events] : adequate knowledge of current events [Past History] : adequate knowledge of personal past history [Vocabulary] : adequate range of vocabulary [Cranial Nerves Oculomotor (III)] : extraocular motion intact [Cranial Nerves Trigeminal (V)] : facial sensation intact symmetrically [Cranial Nerves Facial (VII)] : face symmetrical [Cranial Nerves Vestibulocochlear (VIII)] : hearing was intact bilaterally [Cranial Nerves Glossopharyngeal (IX)] : tongue and palate midline [Cranial Nerves Accessory (XI - Cranial And Spinal)] : head turning and shoulder shrug symmetric [Cranial Nerves Hypoglossal (XII)] : there was no tongue deviation with protrusion [Motor Tone] : muscle tone was normal in all four extremities [Motor Strength] : muscle strength was normal in all four extremities [No Muscle Atrophy] : normal bulk in all four extremities [Motor Handedness Right-Handed] : the patient is right hand dominant [Sensation Tactile Decrease] : light touch was intact [Balance] : balance was intact [Extraocular Movements] : extraocular movements were intact [Outer Ear] : the ears and nose were normal in appearance [Neck Appearance] : the appearance of the neck was normal [] : no respiratory distress [Respiration, Rhythm And Depth] : normal respiratory rhythm and effort [Heart Rate And Rhythm] : heart rate was normal and rhythm regular [Abnormal Walk] : normal gait [Involuntary Movements] : no involuntary movements were seen [Skin Color & Pigmentation] : normal skin color and pigmentation [Skin Turgor] : normal skin turgor [Limited Balance] : balance was intact [Past-pointing] : there was no past-pointing [Tremor] : no tremor present [Coordination - Dysmetria Impaired Finger-to-Nose Bilateral] : not present [FreeTextEntry6] : no drift
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
## 2023-03-06 NOTE — REVIEW OF SYSTEMS
[As Noted in HPI] : as noted in HPI [Negative] : Heme/Lymph [de-identified] : headaches\par  [FreeTextEntry7] : nausea with headaches associated with menstrual periods

## 2023-03-13 ENCOUNTER — APPOINTMENT (OUTPATIENT)
Dept: ANTEPARTUM | Facility: CLINIC | Age: 38
End: 2023-03-13
Payer: COMMERCIAL

## 2023-03-13 PROCEDURE — 99213 OFFICE O/P EST LOW 20 MIN: CPT

## 2023-03-13 PROCEDURE — 76819 FETAL BIOPHYS PROFIL W/O NST: CPT

## 2023-03-13 PROCEDURE — 76816 OB US FOLLOW-UP PER FETUS: CPT

## 2023-03-31 ENCOUNTER — OUTPATIENT (OUTPATIENT)
Dept: OUTPATIENT SERVICES | Facility: HOSPITAL | Age: 38
LOS: 1 days | End: 2023-03-31
Payer: COMMERCIAL

## 2023-03-31 DIAGNOSIS — Z90.49 ACQUIRED ABSENCE OF OTHER SPECIFIED PARTS OF DIGESTIVE TRACT: Chronic | ICD-10-CM

## 2023-03-31 DIAGNOSIS — Z11.52 ENCOUNTER FOR SCREENING FOR COVID-19: ICD-10-CM

## 2023-03-31 DIAGNOSIS — Z98.82 BREAST IMPLANT STATUS: Chronic | ICD-10-CM

## 2023-03-31 LAB — SARS-COV-2 RNA SPEC QL NAA+PROBE: SIGNIFICANT CHANGE UP

## 2023-03-31 PROCEDURE — U0005: CPT

## 2023-03-31 PROCEDURE — U0003: CPT

## 2023-03-31 PROCEDURE — C9803: CPT

## 2023-04-01 ENCOUNTER — TRANSCRIPTION ENCOUNTER (OUTPATIENT)
Age: 38
End: 2023-04-01

## 2023-04-01 ENCOUNTER — INPATIENT (INPATIENT)
Facility: HOSPITAL | Age: 38
LOS: 1 days | Discharge: HOME | End: 2023-04-03
Attending: STUDENT IN AN ORGANIZED HEALTH CARE EDUCATION/TRAINING PROGRAM | Admitting: STUDENT IN AN ORGANIZED HEALTH CARE EDUCATION/TRAINING PROGRAM
Payer: COMMERCIAL

## 2023-04-01 VITALS — OXYGEN SATURATION: 100 %

## 2023-04-01 DIAGNOSIS — Z98.82 BREAST IMPLANT STATUS: Chronic | ICD-10-CM

## 2023-04-01 DIAGNOSIS — Z90.49 ACQUIRED ABSENCE OF OTHER SPECIFIED PARTS OF DIGESTIVE TRACT: Chronic | ICD-10-CM

## 2023-04-01 DIAGNOSIS — O26.899 OTHER SPECIFIED PREGNANCY RELATED CONDITIONS, UNSPECIFIED TRIMESTER: ICD-10-CM

## 2023-04-01 DIAGNOSIS — Z34.80 ENCOUNTER FOR SUPERVISION OF OTHER NORMAL PREGNANCY, UNSPECIFIED TRIMESTER: ICD-10-CM

## 2023-04-01 LAB
BASOPHILS # BLD AUTO: 0.04 K/UL — SIGNIFICANT CHANGE UP (ref 0–0.2)
BASOPHILS NFR BLD AUTO: 0.4 % — SIGNIFICANT CHANGE UP (ref 0–2)
BLD GP AB SCN SERPL QL: NEGATIVE — SIGNIFICANT CHANGE UP
EOSINOPHIL # BLD AUTO: 0.03 K/UL — SIGNIFICANT CHANGE UP (ref 0–0.5)
EOSINOPHIL NFR BLD AUTO: 0.3 % — SIGNIFICANT CHANGE UP (ref 0–6)
HCT VFR BLD CALC: 40.4 % — SIGNIFICANT CHANGE UP (ref 34.5–45)
HGB BLD-MCNC: 13.6 G/DL — SIGNIFICANT CHANGE UP (ref 11.5–15.5)
IMM GRANULOCYTES NFR BLD AUTO: 0.3 % — SIGNIFICANT CHANGE UP (ref 0–0.9)
LYMPHOCYTES # BLD AUTO: 1.47 K/UL — SIGNIFICANT CHANGE UP (ref 1–3.3)
LYMPHOCYTES # BLD AUTO: 14.1 % — SIGNIFICANT CHANGE UP (ref 13–44)
MCHC RBC-ENTMCNC: 30.8 PG — SIGNIFICANT CHANGE UP (ref 27–34)
MCHC RBC-ENTMCNC: 33.7 GM/DL — SIGNIFICANT CHANGE UP (ref 32–36)
MCV RBC AUTO: 91.6 FL — SIGNIFICANT CHANGE UP (ref 80–100)
MONOCYTES # BLD AUTO: 0.54 K/UL — SIGNIFICANT CHANGE UP (ref 0–0.9)
MONOCYTES NFR BLD AUTO: 5.2 % — SIGNIFICANT CHANGE UP (ref 2–14)
NEUTROPHILS # BLD AUTO: 8.32 K/UL — HIGH (ref 1.8–7.4)
NEUTROPHILS NFR BLD AUTO: 79.7 % — HIGH (ref 43–77)
NRBC # BLD: 0 /100 WBCS — SIGNIFICANT CHANGE UP (ref 0–0)
PLATELET # BLD AUTO: 235 K/UL — SIGNIFICANT CHANGE UP (ref 150–400)
RBC # BLD: 4.41 M/UL — SIGNIFICANT CHANGE UP (ref 3.8–5.2)
RBC # FLD: 12.6 % — SIGNIFICANT CHANGE UP (ref 10.3–14.5)
RH IG SCN BLD-IMP: POSITIVE — SIGNIFICANT CHANGE UP
WBC # BLD: 10.43 K/UL — SIGNIFICANT CHANGE UP (ref 3.8–10.5)
WBC # FLD AUTO: 10.43 K/UL — SIGNIFICANT CHANGE UP (ref 3.8–10.5)

## 2023-04-01 RX ORDER — SIMETHICONE 80 MG/1
80 TABLET, CHEWABLE ORAL EVERY 4 HOURS
Refills: 0 | Status: DISCONTINUED | OUTPATIENT
Start: 2023-04-01 | End: 2023-04-03

## 2023-04-01 RX ORDER — OXYTOCIN 10 UNIT/ML
333.33 VIAL (ML) INJECTION
Qty: 20 | Refills: 0 | Status: DISCONTINUED | OUTPATIENT
Start: 2023-04-01 | End: 2023-04-01

## 2023-04-01 RX ORDER — SODIUM CHLORIDE 9 MG/ML
3 INJECTION INTRAMUSCULAR; INTRAVENOUS; SUBCUTANEOUS EVERY 8 HOURS
Refills: 0 | Status: DISCONTINUED | OUTPATIENT
Start: 2023-04-01 | End: 2023-04-03

## 2023-04-01 RX ORDER — CHLORHEXIDINE GLUCONATE 213 G/1000ML
1 SOLUTION TOPICAL ONCE
Refills: 0 | Status: DISCONTINUED | OUTPATIENT
Start: 2023-04-01 | End: 2023-04-01

## 2023-04-01 RX ORDER — OXYTOCIN 10 UNIT/ML
4 VIAL (ML) INJECTION
Qty: 30 | Refills: 0 | Status: DISCONTINUED | OUTPATIENT
Start: 2023-04-01 | End: 2023-04-01

## 2023-04-01 RX ORDER — IBUPROFEN 200 MG
600 TABLET ORAL EVERY 6 HOURS
Refills: 0 | Status: DISCONTINUED | OUTPATIENT
Start: 2023-04-01 | End: 2023-04-03

## 2023-04-01 RX ORDER — OXYTOCIN 10 UNIT/ML
41.67 VIAL (ML) INJECTION
Qty: 20 | Refills: 0 | Status: DISCONTINUED | OUTPATIENT
Start: 2023-04-01 | End: 2023-04-03

## 2023-04-01 RX ORDER — LANOLIN
1 OINTMENT (GRAM) TOPICAL EVERY 6 HOURS
Refills: 0 | Status: DISCONTINUED | OUTPATIENT
Start: 2023-04-01 | End: 2023-04-03

## 2023-04-01 RX ORDER — SODIUM CHLORIDE 9 MG/ML
1000 INJECTION, SOLUTION INTRAVENOUS
Refills: 0 | Status: DISCONTINUED | OUTPATIENT
Start: 2023-04-01 | End: 2023-04-01

## 2023-04-01 RX ORDER — DIBUCAINE 1 %
1 OINTMENT (GRAM) RECTAL EVERY 6 HOURS
Refills: 0 | Status: DISCONTINUED | OUTPATIENT
Start: 2023-04-01 | End: 2023-04-03

## 2023-04-01 RX ORDER — KETOROLAC TROMETHAMINE 30 MG/ML
30 SYRINGE (ML) INJECTION ONCE
Refills: 0 | Status: DISCONTINUED | OUTPATIENT
Start: 2023-04-01 | End: 2023-04-01

## 2023-04-01 RX ORDER — OXYCODONE HYDROCHLORIDE 5 MG/1
5 TABLET ORAL
Refills: 0 | Status: DISCONTINUED | OUTPATIENT
Start: 2023-04-01 | End: 2023-04-03

## 2023-04-01 RX ORDER — ACETAMINOPHEN 500 MG
975 TABLET ORAL
Refills: 0 | Status: DISCONTINUED | OUTPATIENT
Start: 2023-04-01 | End: 2023-04-03

## 2023-04-01 RX ORDER — HYDROCORTISONE 1 %
1 OINTMENT (GRAM) TOPICAL EVERY 6 HOURS
Refills: 0 | Status: DISCONTINUED | OUTPATIENT
Start: 2023-04-01 | End: 2023-04-03

## 2023-04-01 RX ORDER — BENZOCAINE 10 %
1 GEL (GRAM) MUCOUS MEMBRANE EVERY 6 HOURS
Refills: 0 | Status: DISCONTINUED | OUTPATIENT
Start: 2023-04-01 | End: 2023-04-03

## 2023-04-01 RX ORDER — ACETAMINOPHEN 500 MG
3 TABLET ORAL
Qty: 0 | Refills: 0 | DISCHARGE
Start: 2023-04-01

## 2023-04-01 RX ORDER — AER TRAVELER 0.5 G/1
1 SOLUTION RECTAL; TOPICAL EVERY 4 HOURS
Refills: 0 | Status: DISCONTINUED | OUTPATIENT
Start: 2023-04-01 | End: 2023-04-03

## 2023-04-01 RX ORDER — OXYCODONE HYDROCHLORIDE 5 MG/1
5 TABLET ORAL ONCE
Refills: 0 | Status: DISCONTINUED | OUTPATIENT
Start: 2023-04-01 | End: 2023-04-03

## 2023-04-01 RX ORDER — DIPHENHYDRAMINE HCL 50 MG
25 CAPSULE ORAL EVERY 6 HOURS
Refills: 0 | Status: DISCONTINUED | OUTPATIENT
Start: 2023-04-01 | End: 2023-04-03

## 2023-04-01 RX ORDER — TETANUS TOXOID, REDUCED DIPHTHERIA TOXOID AND ACELLULAR PERTUSSIS VACCINE, ADSORBED 5; 2.5; 8; 8; 2.5 [IU]/.5ML; [IU]/.5ML; UG/.5ML; UG/.5ML; UG/.5ML
0.5 SUSPENSION INTRAMUSCULAR ONCE
Refills: 0 | Status: DISCONTINUED | OUTPATIENT
Start: 2023-04-01 | End: 2023-04-03

## 2023-04-01 RX ORDER — IBUPROFEN 200 MG
600 TABLET ORAL EVERY 6 HOURS
Refills: 0 | Status: COMPLETED | OUTPATIENT
Start: 2023-04-01 | End: 2024-02-28

## 2023-04-01 RX ORDER — PRAMOXINE HYDROCHLORIDE 150 MG/15G
1 AEROSOL, FOAM RECTAL EVERY 4 HOURS
Refills: 0 | Status: DISCONTINUED | OUTPATIENT
Start: 2023-04-01 | End: 2023-04-03

## 2023-04-01 RX ORDER — CITRIC ACID/SODIUM CITRATE 300-500 MG
15 SOLUTION, ORAL ORAL EVERY 6 HOURS
Refills: 0 | Status: DISCONTINUED | OUTPATIENT
Start: 2023-04-01 | End: 2023-04-01

## 2023-04-01 RX ORDER — IBUPROFEN 200 MG
1 TABLET ORAL
Qty: 0 | Refills: 0 | DISCHARGE
Start: 2023-04-01

## 2023-04-01 RX ORDER — MAGNESIUM HYDROXIDE 400 MG/1
30 TABLET, CHEWABLE ORAL
Refills: 0 | Status: DISCONTINUED | OUTPATIENT
Start: 2023-04-01 | End: 2023-04-03

## 2023-04-01 RX ADMIN — Medication 975 MILLIGRAM(S): at 20:24

## 2023-04-01 RX ADMIN — Medication 975 MILLIGRAM(S): at 21:25

## 2023-04-01 RX ADMIN — SODIUM CHLORIDE 125 MILLILITER(S): 9 INJECTION, SOLUTION INTRAVENOUS at 13:57

## 2023-04-01 RX ADMIN — Medication 125 MILLIUNIT(S)/MIN: at 15:00

## 2023-04-01 RX ADMIN — Medication 4 MILLIUNIT(S)/MIN: at 14:01

## 2023-04-01 RX ADMIN — Medication 30 MILLIGRAM(S): at 15:10

## 2023-04-01 RX ADMIN — Medication 600 MILLIGRAM(S): at 23:33

## 2023-04-01 NOTE — PRE-ANESTHESIA EVALUATION ADULT - SPO2 (%)
Patient had a endoscopy and colonoscopy in  by Dr. Mark.  There was evidence of diverticulosis otherwise normal colon was recommended to repeat a colonoscopy in 10 years.  Since patient had 2 episodes of diverticulitis that resolved with antibiotics.  No evidence of further complication.  Patient now comes to visit for discussion of her symptoms.  Will consider use of probiotics.  No plan of endoscopic procedure at this moment.  Will need records from Dr. Mark. Patient takes PPi daily does have family h/o gastric cancer and a brother with pancreatic cancer. Patient with as per patient had normal labs. Will conisder EGD.Ultrasound done recently is unremarkable normal liver normal gallbladder normal pancreas spleen and kidneys.  : Patient persist with dyspepsia and takes PPI once a day doing well but not able  to wean off of PPi will add Sucralfate BID and will try to wean off PPI. and will consider EGD in the next visit in 2 months.  ; patient feels better with occasional heartburn, will need to control better her glucose, patient stop cholesterol and glucose treatment. Need to continue a regimen for her diabetes patient wean her self off PPI and sucralfate. (Brother and sister  of cirrhosis) Will need to check labs and ultrasound, and will consider EGD if symptoms worsen or recur. Will ask results of recent labs. discussion [Use for free text].   10/20 Patient here today complaining of left lower quadrant abdominal pain. The pain started 2 days ago.  She denies any fever, nausea, vomiting, diarrhea, rectal bleeding.  Last colonoscopy done 7 years ago shows evidence of sigmoid colon diverticular disease.  At physical exam patient with mild guarding on deep palpation of the left lower quadrant abdomen. Plan: Patient we had STAT abdomen pelvis CT scan with contrast. Antibiotic treatment upon CT results today. N.p.o. for now. Addendum: CT scan showed evidence of uncomplicated diverticulitis. Patient will do treatment with Metronidazole and Cipro for 10 days.  Clear liquid diet for two days. 10/20 Patient here today in better general state, she is doing treatment for diverticulitis above described.  Still having abdominal discomfort over the left lower quadrant, denies any fever, nausea, vomiting, diarrhea, rectal bleeding, or constipation.  Patient mentioned that she has a brother and sister who  with liver cirrhosis and pancreatic problems.  Patient has done a ultrasound on 2020 that shows normal pancreas and liver.  Also recent CT scan abdomen shows evidence of probably cyst or hemangioma of the liver with no stigmata of malignancy, gallbladder sediment. Plan: Patient will have colonoscopy in 8-week. Continue antibiotic treatment. With CT scan abdomen and a year to follow-up for liver cyst/hemangioma. 3/21: Patient 2020 was seen with abdominal pain, CT scan of abdomen and pelvis show mild acute uncomplicated diverticulitis involving the distal descending colon.  And a tiny sliding hiatal hernia.  Patient was treated with antibiotics symptoms improved.  Had been  a colonoscopy with diverticulosis in the sigmoid colon and ascending colon with no evidence of inflammation or bleeding.  Internal hemorrhoids and colonic polyps.  5 mm polyp in the transverse colon and 2 polyps in the ileocecal valve all of them removed with cold biopsy forcep.  Pathology report shows that the polyps were tubular adenomas at the ileocecal valve and hyperplastic polyp in the colon transfer.  Was recommended to repeat a colonoscopy in 5 years by protocol. patient with occasional constipation, will try daily miralax 17 gr, for now and increase water and fiber in her diet. Will follow as needed basis.  3/22: Patient with GERD and epigastric pain related to heavy meals. Patient was in [place] and start having abdominal pain. heartburn, will plan trial with PPi and carafate. Denies diarrhea, rectal bleeding, or other health issues. Patient had upper endoscopy in 2022 with evidence of a small hiatal hernia and distal esophagitis, biopsies show no evidence of reflux negative for Monet's esophagus, negative for dysplasia.  Patient also have gastritis negative for H. pylori and normal duodenum.  With this finding we will start treatment with PPI and Carafate, will follow in 3 months. Will do ultrasound of the abdomen. : Patient had ultrasound with cystic lesion of the kidney, will defer to PCP follow up and evaluation. Pancreas no completely evaluated, no other pathology seen. Patient since last week with LLQ  similar with previos episode of diverticulitis. Today there is no abdominal pain. will monitor her clinical course, patient feels better but persist with constipation, will start Miralax and will call back if symptoms recur.   : Patient with miralax twice a day, drinking water, with regular BM,  denies further LLQ tenderness.  Patient doing well, will try to wean her off the PPI. Will follow as needed basis, will follow in 6 months.
100

## 2023-04-01 NOTE — DISCHARGE NOTE OB - PLAN OF CARE
routine postpartum care After discharge, please stay on pelvic rest for 6 weeks, meaning no sexual intercourse, no tampons and no douching.  No driving for 2 weeks as women can loose a lot of blood during delivery and there is a possibility of being lightheaded/fainting.  No lifting objects heavier than baby for two weeks.  Expect to have vaginal bleeding/spotting for up to six weeks.  The bleeding should get lighter and more white/light brown with time.  For bleeding soaking more than a pad an hour or passing clots greater than the size of your fist, come in to the emergency department.

## 2023-04-01 NOTE — OB RN PATIENT PROFILE - FALL HARM RISK - UNIVERSAL INTERVENTIONS
Bed in lowest position, wheels locked, appropriate side rails in place/Call bell, personal items and telephone in reach/Instruct patient to call for assistance before getting out of bed or chair/Non-slip footwear when patient is out of bed/[place] to call system/Physically safe environment - no spills, clutter or unnecessary equipment/Purposeful Proactive Rounding/Room/bathroom lighting operational, light cord in reach

## 2023-04-01 NOTE — OB RN DELIVERY SUMMARY - NSSELHIDDEN_OBGYN_ALL_OB_FT
[NS_DeliveryAttending1_OBGYN_ALL_OB_FT:[id number]=],[NS_CirculateRN2_OBGYN_ALL_OB_FT:[id number]==]

## 2023-04-01 NOTE — DISCHARGE NOTE OB - CARE PLAN
1 Principal Discharge DX:	Vaginal delivery  Assessment and plan of treatment:	routine postpartum care   Principal Discharge DX:	Vaginal delivery  Assessment and plan of treatment:	After discharge, please stay on pelvic rest for 6 weeks, meaning no sexual intercourse, no tampons and no douching.  No driving for 2 weeks as women can loose a lot of blood during delivery and there is a possibility of being lightheaded/fainting.  No lifting objects heavier than baby for two weeks.  Expect to have vaginal bleeding/spotting for up to six weeks.  The bleeding should get lighter and more white/light brown with time.  For bleeding soaking more than a pad an hour or passing clots greater than the size of your fist, come in to the emergency department.

## 2023-04-01 NOTE — OB PROVIDER DELIVERY SUMMARY - NSPROVIDERDELIVERYNOTE_OBGYN_ALL_OB_FT
viable male infant delivered in vtx position with ease. delayed cord clamping performed. placenta delivered shortly afterwards spontaneously and intact. small very superficial 1cm vaginal abrasion repaired with one interrupted suture with 2-0 chromic. cervix and sulci intact. count correct.

## 2023-04-01 NOTE — OB RN TRIAGE NOTE - PRO INTERPRETER NEED 2
"Requested Prescriptions   Pending Prescriptions Disp Refills     [facility] B-12 1000 MCG CR tablet [Pharmacy Med Name: [facility] VIT B-12 TR 1,000 MCG TAB] 30 tablet 2    Last Written Prescription Date:  9/11/18  Last Fill Quantity: 30,  # refills: 2   Last Office Visit with [facility], P or [facility] prescribing provider:  11/27/18   Future Office Visit:      Sig: TAKE 1 TABLET (1,000 MCG) BY MOUTH DAILY    Vitamin Supplements (Adult) Protocol Passed - 12/16/2018  1:58 PM       Passed - High dose Vitamin D not ordered       Passed - Recent (12 mo) or future (30 days) visit within the authorizing provider's specialty    Patient had office visit in the last 12 months or has a visit in the next 30 days with authorizing provider or within the authorizing provider's specialty.  See \"Patient Info\" tab in inbasket, or \"Choose Columns\" in Meds & Orders section of the refill encounter.                " English

## 2023-04-01 NOTE — DISCHARGE NOTE OB - PATIENT PORTAL LINK FT
You can access the FollowMyHealth Patient Portal offered by [facility] by registering at the following website: [email or web address]. By joining [facility]’s FollowMyHealth portal, you will also be able to view your health information using other applications (apps) compatible with our system.
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
## 2023-04-01 NOTE — DISCHARGE NOTE OB - HOSPITAL COURSE
pt was admitted in labor. had uncomplicated vaginal delivery. postpartum course uncomplicated and pt discharged on PPD pt was admitted in labor. had uncomplicated vaginal delivery. postpartum course uncomplicated and pt discharged on PPD2

## 2023-04-01 NOTE — OB PROVIDER H&P - NSLOWPPHRISK_OBGYN_A_OB
[place] Pregnancy/Less than or equal to 4 previous vaginal births/No known bleeding disorder/No history of postpartum hemorrhage/No other PPH risks indicated

## 2023-04-01 NOTE — DISCHARGE NOTE OB - CARE PROVIDER_API CALL
Kalani Callahan (DO)  [place] [address]  Phone: [phone]  Fax: [phone]  Follow Up Time:

## 2023-04-01 NOTE — DISCHARGE NOTE OB - MEDICATION SUMMARY - MEDICATIONS TO TAKE
I will START or STAY ON the medications listed below when I get home from the hospital:    Tylenol 325 mg oral tablet  -- 3 tab(s) by mouth every 8 hours  -- Indication: For pain      mg oral tablet  -- 1 tab(s) by mouth every 6 hours  -- Indication: For pain     Prenatal Multivitamins with Folic Acid 1 mg oral tablet  -- 1 tab(s) by mouth once a day  -- Indication: For routine postpartum

## 2023-04-01 NOTE — OB PROVIDER H&P - NSHPREVIEWOFSYSTEMS_GEN_ALL_CORE
ICU Vital Signs Last 24 Hrs  T(C): 36.6 (01 Apr 2023 10:55), Max: 36.6 (01 Apr 2023 10:55)  T(F): 97.9 (01 Apr 2023 10:55), Max: 97.9 (01 Apr 2023 10:55)  HR: 70 (01 Apr 2023 11:11) (62 - 83)  BP: 131/68 (01 Apr 2023 10:55) (131/68 - 131/68)  BP(mean): --  ABP: --  ABP(mean): --  RR: 18 (01 Apr 2023 10:55) (18 - 18)  SpO2: 100% (01 Apr 2023 11:11) (93% - 100%)    O2 Parameters below as of 01 Apr 2023 10:55  Patient On (Oxygen Delivery Method): room air        gen: A&ox3  CV: rrr s1s2  abd: gravid soft  VE: 4/80/-1 intact  EFM: 115 moderate variability, + acels, -decels, category 1 tracing  toco: Q2mins  bedside sonogram: cephalic presentation

## 2023-04-01 NOTE — OB RN PATIENT PROFILE - NS_OBGYNHISTORY_OBGYN_ALL_OB_FT
NSD x2 full term no complications  Incomplete Ab x1 D+c  2018 Cholycystectomy   2004 breast augmentation  19 years ago abnormal pap resolved  Anxiety and depression Hx no meds or therapy currently  Chiari malformation Dx recently followed by Neuro

## 2023-04-01 NOTE — DISCHARGE NOTE OB - NS MD DC FALL RISK RISK
For information on Fall & Injury Prevention, visit: [email or web address] OR  [email or web address] OR  https://www.cdc.gov/steadi/patient.html

## 2023-04-01 NOTE — OB RN TRIAGE NOTE - NS_OBGYNHISTORY_OBGYN_ALL_OB_FT
NSD x2 full term no complications  Incomplete Ab x1 D+c  2018 Cholycystectomy   2004 breast augmentation  19 years ago abnormal pap resolved  Anxiety and depression Hx no meds or therapy currently

## 2023-04-01 NOTE — OB PROVIDER H&P - ASSESSMENT
The patient is a 38 y/o  @ 39.2 weeks admitted in early labor  -admit to L&D  -routine labs  -NPO bicitra  -EFM/toco  -IV hydration  -4 epidural , anesthesia consult obtained  -bedside sonogram  -anesthesia consult  -anticipated     seen and examined with Dr. Sindy Santamaria NP

## 2023-04-01 NOTE — OB PROVIDER H&P - NS_GESTAGE_OBGYN_ALL_OB_FT
Received refill request from Pharmacy  Last OV 3.30.22  Last refill 3.15.22  Medication pending    
39w2d

## 2023-04-01 NOTE — OB RN DELIVERY SUMMARY - NS_SEPSISRSKCALC_OBGYN_ALL_OB_FT
Rupture of membranes must be entered above.   EOS calculated successfully. EOS Risk Factor: 0.5/1000 live births ([facility] national incidence); GA=39w2d; Temp=98.78; ROM=2.067; GBS='Negative'; Antibiotics='No antibiotics or any antibiotics < 2 hrs prior to birth'

## 2023-04-02 LAB
COVID-19 SPIKE DOMAIN AB INTERP: POSITIVE
COVID-19 SPIKE DOMAIN ANTIBODY RESULT: >250 U/ML — HIGH
SARS-COV-2 IGG+IGM SERPL QL IA: >250 U/ML — HIGH
SARS-COV-2 IGG+IGM SERPL QL IA: POSITIVE
T PALLIDUM AB TITR SER: NEGATIVE — SIGNIFICANT CHANGE UP

## 2023-04-02 RX ADMIN — Medication 600 MILLIGRAM(S): at 06:20

## 2023-04-02 RX ADMIN — Medication 600 MILLIGRAM(S): at 17:40

## 2023-04-02 RX ADMIN — Medication 975 MILLIGRAM(S): at 21:45

## 2023-04-02 RX ADMIN — Medication 600 MILLIGRAM(S): at 00:35

## 2023-04-02 RX ADMIN — Medication 600 MILLIGRAM(S): at 05:18

## 2023-04-02 RX ADMIN — Medication 600 MILLIGRAM(S): at 17:08

## 2023-04-02 RX ADMIN — Medication 975 MILLIGRAM(S): at 21:20

## 2023-04-02 NOTE — CHART NOTE - NSCHARTNOTEFT_GEN_A_CORE
Pt complaining of new "heaviness in her legs" after walking around this afternoon. Pt states she was able to walk around earlier in the day and did not experience the heaviness prior. Pt denies any motor weakness or numbness/tingling in b/l upper and lower extremities. On exam, motor strength is 5/5 and sensation intact to light touch in b/l upper and lower extremities throughout - no neuro deficits appreciated. No tenderness to palpation over back and spine - one single puncture mathew visible from epidural placement. Patient unlikely to be experiencing complication from labor epidural. Pt complaining of new subjective "heaviness in her legs" after walking around this afternoon. Pt states she was able to walk around earlier in the day and did not experience the heaviness prior. Pt denies any motor weakness or numbness/tingling in b/l upper and lower extremities, no urinary incontinence. On exam, motor strength is 5/5 and sensation intact to light touch in b/l upper and lower extremities throughout - no focal neuro deficits appreciated. No tenderness to palpation over back and spine - one single puncture mathew visible from epidural placement.     No documented complications noted upon review of anesthesia record. Patient unlikely to be experiencing complication from labor epidural. Should there be a change in neuro exam or new focal complaint, please reconsult anesthesia and consider stat neurology consult.

## 2023-04-03 VITALS
HEART RATE: 59 BPM | OXYGEN SATURATION: 97 % | SYSTOLIC BLOOD PRESSURE: 123 MMHG | RESPIRATION RATE: 18 BRPM | DIASTOLIC BLOOD PRESSURE: 77 MMHG | TEMPERATURE: 98 F

## 2023-04-03 PROCEDURE — 36415 COLL VENOUS BLD VENIPUNCTURE: CPT

## 2023-04-03 PROCEDURE — 85025 COMPLETE CBC W/AUTO DIFF WBC: CPT

## 2023-04-03 PROCEDURE — 86769 SARS-COV-2 COVID-19 ANTIBODY: CPT

## 2023-04-03 PROCEDURE — 86850 RBC ANTIBODY SCREEN: CPT

## 2023-04-03 PROCEDURE — 86900 BLOOD TYPING SEROLOGIC ABO: CPT

## 2023-04-03 PROCEDURE — 59050 FETAL MONITOR W/REPORT: CPT

## 2023-04-03 PROCEDURE — 86780 TREPONEMA PALLIDUM: CPT

## 2023-04-03 PROCEDURE — 86901 BLOOD TYPING SEROLOGIC RH(D): CPT

## 2023-04-03 RX ADMIN — Medication 600 MILLIGRAM(S): at 00:21

## 2023-04-03 RX ADMIN — Medication 600 MILLIGRAM(S): at 01:00

## 2023-04-03 RX ADMIN — Medication 975 MILLIGRAM(S): at 09:30

## 2023-04-03 RX ADMIN — Medication 975 MILLIGRAM(S): at 10:00

## 2023-04-03 NOTE — PROGRESS NOTE ADULT - ASSESSMENT
37y PPD#1 s/p , doing well. 
PPD2 s/p , doing well   -Discharge home   -Precautions given   -Follow-up in office in 6 weeks      Divine Liu MD

## 2023-04-03 NOTE — PROGRESS NOTE ADULT - SUBJECTIVE AND OBJECTIVE BOX
OB Attending Note    S: Patient doing well. Minimal lochia. Pain controlled.    O: Vital Signs Last 24 Hrs  T(C): 36.6 (03 Apr 2023 09:20), Max: 36.8 (02 Apr 2023 17:10)  T(F): 97.8 (03 Apr 2023 09:20), Max: 98.2 (02 Apr 2023 17:10)  HR: 59 (03 Apr 2023 09:20) (59 - 80)  BP: 123/77 (03 Apr 2023 09:20) (118/79 - 123/77)  BP(mean): --  RR: 18 (03 Apr 2023 09:20) (18 - 18)  SpO2: 97% (03 Apr 2023 09:20) (97% - 98%)    Parameters below as of 03 Apr 2023 09:20  Patient On (Oxygen Delivery Method): room air        Gen: NAD  Abd: soft, NT, ND, fundus firm below umbilicus  Lochia: min  Perineum healing well  Ext: no tenderness  
Postpartum Note- PPD#1    Allergies: No Known Allergies    Blood Type: A positive  Rubella: Immune  RPR: Negative      S: Patient is a 37y  PPD#1 s/p .   Patient w/o complaints, pain is controlled.    Pt is OOB, tolerating PO, passing flatus. Lochia WNL.     O:  Vital Signs Last 24 Hrs  T(C): 37.1 (2023 05:05), Max: 37.2 (2023 17:45)  T(F): 98.8 (2023 05:05), Max: 99 (2023 17:45)  HR: 60 (2023 05:05) (60 - 121)  BP: 110/69 (2023 05:05) (108/56 - 140/66)  BP(mean): --  RR: 18 (2023 05:05) (16 - 18)  SpO2: 97% (2023 05:05) (83% - 100%)    Parameters below as of 2023 05:05  Patient On (Oxygen Delivery Method): room air    Gen: NAD  Abdomen: Soft, nontender, non-distended, fundus firm.  Lochia WNL  Ext: Neg edema, Neg calf tenderness    LABS:  Hemoglobin: 13.6 g/dL (23 @ 11:11)  Hematocrit: 40.4 % (23 @ 11:11)      A/P:  37y PPD#1 s/p , doing well.      PMHx: Chiari Malformation   Current Issues: None    Increase OOB  Regular diet  PO Pain protocol  Continue routine post partum care    Allyssa Cordova PA-C

## 2023-04-04 ENCOUNTER — APPOINTMENT (OUTPATIENT)
Dept: ANTEPARTUM | Facility: CLINIC | Age: 38
End: 2023-04-04

## 2023-05-22 NOTE — OB RN TRIAGE NOTE - NS PRO ABUSE SCREEN AFRAID ANYONE YN
Detail Level: Detailed Products Recommended: Cerave General Sunscreen Counseling: I recommended a broad spectrum sunscreen with a SPF of 30 or higher. I explained that SPF 30 sunscreens block approximately 97 percent of the sun's harmful rays. Sunscreens should be applied at least 15 minutes prior to expected sun exposure and then every 2 hours after that as long as sun exposure continues. If swimming or exercising sunscreen should be reapplied every 45 minutes to an hour after getting wet or sweating. One ounce, or the equivalent of a shot glass full of sunscreen, is adequate to protect the skin not covered by a bathing suit. I also recommended a lip balm with a sunscreen as well. Sun protective clothing can be used in lieu of sunscreen but must be worn the entire time you are exposed to the sun's rays. unable to assess

## 2023-06-09 ENCOUNTER — APPOINTMENT (OUTPATIENT)
Dept: NEUROSURGERY | Facility: CLINIC | Age: 38
End: 2023-06-09
Payer: COMMERCIAL

## 2023-06-09 VITALS
WEIGHT: 132 LBS | OXYGEN SATURATION: 98 % | DIASTOLIC BLOOD PRESSURE: 79 MMHG | SYSTOLIC BLOOD PRESSURE: 122 MMHG | HEART RATE: 67 BPM | BODY MASS INDEX: 22.53 KG/M2 | HEIGHT: 64 IN | TEMPERATURE: 98.1 F

## 2023-06-09 DIAGNOSIS — G93.5 COMPRESSION OF BRAIN: ICD-10-CM

## 2023-06-09 PROCEDURE — 99215 OFFICE O/P EST HI 40 MIN: CPT

## 2023-07-03 NOTE — PHYSICAL EXAM
[FreeTextEntry1] : Awake, alert, and oriented x 3. VSS. In no apparent distress.   Short and long term memory intact.  Speech is clear and appropriate.  Affect is normal.  Voice is strong.  Respirations easy and even.  Normal skin color and pigmentation.  The sclera and conjunctiva normal.  Ears, nose, and neck normal in appearance.  EOMI, no nystagmus, facial sensation intact symmetrically, face symmetrical, hearing intact bilaterally, tongue and palate midline, head turning and shoulder shrug symmetric and no tongue deviation with protrusion.  No pronator drift.   No past-pointing, no tremors noted, no dysdiadochokinesia, and finger to nose dysmetria was not present.  \par Rises from a seated position in a comfortable fashion.\par Gait is well coordinated and stable without the use of an assistive device.  Motor strength in the upper extremities 5/5 in the biceps, triceps, and hand .  Motor strength in the lower extremities is 5/5 in the iliopsoas, quadriceps, and hamstrings.  \par \par 
no
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
## 2023-07-03 NOTE — HISTORY OF PRESENT ILLNESS
[FreeTextEntry1] : Ms. Ana María Lock presents for follow up for Chiari 1 malformation with syrinx after vaginal delivery. She delivered full term healthy baby boy on 4/1/2023. She continues to get daily headaches which have worsened in nature and in frequency, occur on one side, with nausea and vomiting and light sensitivity. She reports having to lay down and sleep and takes Advil daily. MRI 1/2023 shows Chairi 1 malformation with syrinx. \par \par \par Plan: MRI brain w wo\par MRI C/T spine with and without \par fu after imaging

## 2023-07-06 ENCOUNTER — APPOINTMENT (OUTPATIENT)
Dept: MRI IMAGING | Facility: CLINIC | Age: 38
End: 2023-07-06

## 2023-07-07 ENCOUNTER — APPOINTMENT (OUTPATIENT)
Dept: MRI IMAGING | Facility: CLINIC | Age: 38
End: 2023-07-07

## 2024-10-17 NOTE — OB PROVIDER H&P - NS_PREOPBLOODCONS_OBGYN_ALL_OB
Spoke with Abigail.   Advised to call Gena, patient's nurse.     Spoke with Gena RN that works with patient.   Entire message from Dr Alfaro shared with RN.   All upcoming appts shared with her.   She said patient does not share any information with her so she was unaware of his prior missed appointments.   She will touch base with patient to make sure he goes to all his appointments.   Aware future refills for patient's psych medications must come from psychiatry.   No further questions.        n/a